# Patient Record
(demographics unavailable — no encounter records)

---

## 2024-11-06 NOTE — DISCUSSION/SUMMARY
[FreeTextEntry1] : Recommend supportive care including antipyretics, fluids, OTC cough/cold medications if age-appropriate, and nasal saline followed by nasal suction. Use of vaporizer/humidifier to help alleviate congestion Return if symptoms worsen or persist or has onset of new fever

## 2024-12-18 NOTE — HISTORY OF PRESENT ILLNESS
[EENT/Resp Symptoms] : EENT/RESPIRATORY SYMPTOMS [de-identified] : nasal congestion, mild cough x 1 day [FreeTextEntry6] : hard to sleep, very congested last night. Denies signs of respiratory distress, no belly muscles, no retractions. some wheeze last night. Nebulizer 2x. Denies cough sounding "barky." No fevers at home.  No ear pulling, vomiting, diarrhea. No change in appetite/fluids. Still maintaining wet diapers.

## 2024-12-18 NOTE — DISCUSSION/SUMMARY
[FreeTextEntry1] : Supportive treatment including adequate hydration, nebulizer treatment, nasal suction, and Tylenol/Motrin. Hand washing and infection control discussed.  Return if symptoms persist or worsen.

## 2024-12-18 NOTE — BEGINNING OF VISIT
[Father] : father Libtayo Counseling- I discussed with the patient the risks of Libtayo including but not limited to nausea, vomiting, diarrhea, and bone or muscle pain.  The patient verbalized understanding of the proper use and possible adverse effects of Libtayo.  All of the patient's questions and concerns were addressed.

## 2025-03-01 NOTE — DISCUSSION/SUMMARY
[FreeTextEntry1] : Rapid flu positive for influenza B in office Rapid COVID negative Discussed with parent influenza diagnosis and management Recommend supportive care with increased fluids, rest, Tylenol or Motrin for fever or discomfort, NS nebs prn  Return to office if symptoms worsen or persist

## 2025-03-01 NOTE — PHYSICAL EXAM
[Playful] : playful [Transmitted Upper Airway Sounds] : transmitted upper airway sounds [NL] : warm, clear [Stridor] : no stridor [Erythema] : no erythema [Wheezing] : no wheezing [Rales] : no rales [Tachypnea] : no tachypnea [Rhonchi] : no rhonchi [Subcostal Retractions] : no subcostal retractions [Suprasternal Retractions] : no suprasternal retractions [FreeTextEntry4] : congestion [FreeTextEntry7] : good aeration throughout lung fields

## 2025-03-01 NOTE — REVIEW OF SYSTEMS
[Fever] : fever [Cough] : cough [Congestion] : congestion [Negative] : Genitourinary [Nasal Congestion] : nasal congestion [Tachypnea] : not tachypneic [Wheezing] : no wheezing

## 2025-03-01 NOTE — HISTORY OF PRESENT ILLNESS
[de-identified] : cough low grade fever Thursday and today had a 103 [FreeTextEntry6] : Cough and congestion x 2 days, fever today 103, Motrin given at 8 am No SOB, no difficulty breathing, tachypnea, retractions, wheeze, stridor No vomiting, diarrhea, rash Good PO/UO/BM Mother giving NS nebs for congestion History of Albuterol use with URIs in first year of life

## 2025-05-07 NOTE — HISTORY OF PRESENT ILLNESS
[de-identified] : cough and congestion starting yesterday. no fever. no v/d. good appetite. drinking fluids well. [FreeTextEntry6] : 1 day cough and congestion, he is afebrile. Eating well.

## 2025-05-07 NOTE — DISCUSSION/SUMMARY
[FreeTextEntry1] : Supportive measures for upper respiratory infection were discussed. Such measures include use of nasal saline and suction as needed to clear the nasal passages, increasing fluids, hot showers or steam from the bathroom, propping the child up on a second pillow (for children > 1year old), use of an OTC home remedy such as vapo rub for comfort and giving 1 tablespoon of honey an hour before bedtime for cough.  Ibuprofen or acetaminophen can be used for fever or pain as per 's instructions. Return for new or worsening symptoms.

## 2025-05-12 NOTE — REVIEW OF SYSTEMS
[Fever] : fever [Nasal Congestion] : nasal congestion [Congestion] : congestion [Negative] : Genitourinary [Tachypnea] : not tachypneic [Wheezing] : no wheezing [Cough] : no cough

## 2025-05-12 NOTE — DISCUSSION/SUMMARY
[FreeTextEntry1] : Discussed with parent viral illness May use Tylenol or Ibuprofen as needed for fever or discomfort. Recommend supportive care including increased fluids, nasal saline followed by nasal suction. cool mist humidifier, elevated head of crib, warm baths Return if symptoms worsen or persist

## 2025-05-12 NOTE — HISTORY OF PRESENT ILLNESS
[de-identified] : dad reports pt with fevers since 5am. tmax 103.1, tylenol given. per dad pt has been fussy. appetite ok, +UO, denies any other symptoms [FreeTextEntry6] : Woke up with fever at 5 am today of 103.1, Motrin given Fever went down then up again at 9 am to 100.5, Tylenol given Seen 5 days with cough and congestion, diagnosed with URI, cough has improved, still has congestion No ear pain No SOB, no difficulty breathing, tachypnea, retractions, wheeze, stridor No vomiting, diarrhea, rash Good PO/UO/BM

## 2025-05-12 NOTE — PHYSICAL EXAM
[Playful] : playful [Clear Rhinorrhea] : clear rhinorrhea [NL] : warm, clear [Stridor] : no stridor [Erythema] : no erythema [Wheezing] : no wheezing [Rales] : no rales [Tachypnea] : no tachypnea [Rhonchi] : no rhonchi [Subcostal Retractions] : no subcostal retractions [Suprasternal Retractions] : no suprasternal retractions

## 2025-07-05 NOTE — HISTORY OF PRESENT ILLNESS
[de-identified] : Mom reports pt had tmax of 104 on thursday. [facility] visit yesterday. Mom noticed white spots on tongue yesterday. Rapid strep at [facility] negative.  [FreeTextEntry6] : Fever x 2 days, decreased appetite, fussy. Saw white spots on tongue, went to [facility]. Strep negative.